# Patient Record
(demographics unavailable — no encounter records)

---

## 2025-01-21 NOTE — PHYSICAL EXAM

## 2025-01-21 NOTE — DISCUSSION/SUMMARY
[Normal Growth] : growth [Normal Development] : development  [No Elimination Concerns] : elimination [Continue Regimen] : feeding [No Skin Concerns] : skin [Normal Sleep Pattern] : sleep [None] : no medical problems [Anticipatory Guidance Given] : Anticipatory guidance addressed as per the history of present illness section [No Medications] : ~He/She~ is not on any medications [] : The components of the vaccine(s) to be administered today are listed in the plan of care. The disease(s) for which the vaccine(s) are intended to prevent and the risks have been discussed with the caretaker.  The risks are also included in the appropriate vaccination information statements which have been provided to the patient's caregiver.  The caregiver has given consent to vaccinate. [FreeTextEntry1] : Patient to return for a well  appointment in  1 year  throw away pacifier  to see ENT vaccines

## 2025-01-21 NOTE — BEGINNING OF VISIT
[Mother] : mother [Patient] : patient [FreeTextEntry1] : LEIDA SOCORRORAY is a 4 year old here for well

## 2025-01-21 NOTE — DEVELOPMENTAL MILESTONES
[Normal Development] : Normal Development [Goes to the bathroom and has] : goes to bathroom and has bowel movement by self [Dresses and undresses without] : dresses and undresses without much help [Plays make-believe] : plays make-believe [Uses 4-word sentences] : uses 4-word sentences [Uses words that are 100%] : uses words that are 100% intelligible to strangers [Tells a story from a book] : tells a story from a book [Climbs stairs, alternating feet] : climbs stairs, alternating feet without support [Skips on one foot] : skips on one foot [Draws a person with head and] : draws a person with head and 3 body part [Draws a simple cross] : draws a simple cross [Unbuttons medium-sized buttons] : unbuttons medium sized buttons [Grasps a pencil with thumb and] : grasps a pencil with thumb and fingers instead of fist [Draws recognizable pictures] : draws recognizable pictures [Yes] : Completed.

## 2025-01-21 NOTE — HISTORY OF PRESENT ILLNESS
[Mother] : mother [whole ___ oz/d] : consumes [unfilled] oz of whole cow's milk per day [Fruit] : fruit [Vegetables] : vegetables [Meat] : meat [Grains] : grains [Eggs] : eggs [Fish] : fish [Dairy] : dairy [Toilet Trained] : toilet trained [Normal] : Normal [In own bed] : In own bed [Pacifier use] : Pacifier use [Brushing teeth] : Brushing teeth [Yes] : Patient goes to dentist yearly [Vitamin] : Primary Fluoride Source: Vitamin [In Pre-K] : In Pre-K [No] : Not at  exposure [Carbon Monoxide Detectors] : Carbon monoxide detectors [Smoke Detectors] : Smoke detectors [Up to date] : Up to date [YES] : Yes [Are there any children in your household?] : There are children in the household. [de-identified] : 2 small cavities  [FreeTextEntry9] : [employer]  [Are there any unlocked firearms stored in your household?] : No unlocked firearms in the household. [Are there any firearms stored in your household that are loaded?] : No firearms are stored in the household loaded. [Has anyone in the household been feeling low/depressed/been struggling?] : No one in the household has been feeling low/depressed/been struggling. [Have you attended a firearm safety workshop or class?] : No firearm safety workshop or class has been attended. [FreeTextEntry1] :  mom says pt is saying what a lot  mom says pt complains after eating of stomachache

## 2025-02-12 NOTE — HISTORY OF PRESENT ILLNESS
[FreeTextEntry1] : Crystal is a 4 year old female seen for initial behavioral audiologic evaluation following failed screening and abnormal tympanogram at pediatrician's office. Bitely hearing screening passed at birth. Mother reports concern re: hearing over past year- pt requires consistent repetition of words, says "what" frequently. Speech developing appropriately as per mother.

## 2025-02-12 NOTE — ASSESSMENT
[FreeTextEntry1] : Results and recommendations discussed with mother- mother would like to proceed with ENT at this site. Tasked ENT team to schedule.

## 2025-02-12 NOTE — PROCEDURE
[226 Hz] : 226 Hz [Normal Eardrum Mobility] : consistent with restricted eardrum mobility [Type B Tympanogram] : Type B Flat [] : Audiogram: [Play Audiometry] : Play Audiometry [Good] : good [de-identified] : Moderate hearing loss 250-1kHz in at least one ear (soundfield) and mild hearing loss 2-4kHz bilaterally (headphones). Results of unmasked bone conduction WNL 2-4kHz in at least one ear. Pt fatigued for further testing under headphones/masked bone conduction.

## 2025-02-12 NOTE — PLAN
[FreeTextEntry2] : ENT evaluation re: middle ear status and conductive hearing loss (scheduled for 2/21/25)

## 2025-02-21 NOTE — CONSULT LETTER
[FreeTextEntry2] : Tawny Lange, DO [address] [place], [place] [id number] [phone] [FreeTextEntry3] : Aba Topete MD Pediatric Otolaryngology [address] [address] Tel [phone] Fax [phone]

## 2025-02-21 NOTE — HISTORY OF PRESENT ILLNESS
[No Personal or Family History of Easy Bruising, Bleeding, or Issues with General Anesthesia] : No Personal or Family History of easy bruising, bleeding, or issues with general anesthesia [de-identified] : 5 y/o F presents for evaluation for hearing loss Concerns for past year -- failed hearing test in PCP office.  Constantly saying "what" or listening to TV on loud volume No family history of hearing loss. No history of recurrent ear infections No speech concerns.  Constant nasal congestion, occasional anterior rhinorrhea No nasal spray usage.  Denies recent fevers or infections

## 2025-02-21 NOTE — DATA REVIEWED
[FreeTextEntry1] :  I ordered, reviewed and interpreted today's audiometric testing myself and discussed the results with the family.   Suspected Diagnosis: bilateral conductive hearing loss:   My interpretation is in keeping with:   Right ear: type B tympanogram (likely due to Eustachian tube dysfunction or effusion) Left ear:  type B tympanogram (likely due to Eustachian tube dysfunction or effusion)   Bilateral conductive hearing loss / hearing loss in the soundfield

## 2025-02-21 NOTE — PHYSICAL EXAM
[Effusion] : effusion [Exposed Vessel] : left anterior vessel not exposed [Clear to Auscultation] : lungs were clear to auscultation bilaterally [Wheezing] : no wheezing [Increased Work of Breathing] : no increased work of breathing with use of accessory muscles and retractions [Normal Gait and Station] : normal gait and station [Normal muscle strength, symmetry and tone of facial, head and neck musculature] : normal muscle strength, symmetry and tone of facial, head and neck musculature [Normal] : no cervical lymphadenopathy

## 2025-02-21 NOTE — REASON FOR VISIT
[Initial Evaluation] : an initial evaluation for [Parents] : parents [FreeTextEntry2] : hearing loss

## 2025-02-28 NOTE — HISTORY OF PRESENT ILLNESS
[EENT/Resp Symptoms] : EENT/RESPIRATORY SYMPTOMS [Fever] : fever [Eye redness] : eye redness [Runny nose] : runny nose [___ Day(s)] : [unfilled] day(s) [Clear rhinorrhea] : clear rhinorrhea [Sick Contacts: ___] : no sick contacts [Cough] : no cough [Vomiting] : no vomiting [Diarrhea] : no diarrhea

## 2025-02-28 NOTE — PHYSICAL EXAM
[Conjuctival Injection] : conjunctival injection [Discharge] : discharge [Right] : (right) [Clear] : right tympanic membrane clear [NL] : regular rate and rhythm, normal S1, S2 audible, no murmurs

## 2025-03-20 NOTE — HISTORY OF PRESENT ILLNESS
[Preoperative Visit] : for a medical evaluation prior to surgery [Good] : Good [Prior Anesthesia] : Prior anesthesia [Anesthesia Reaction] : no anesthesia reaction [FreeTextEntry1] : bmt and adenoids  [FreeTextEntry2] : 3/24/25

## 2025-03-20 NOTE — PHYSICAL EXAM
[General Appearance - Well Developed] : interactive [General Appearance - Well-Appearing] : well appearing [General Appearance - In No Acute Distress] : in no acute distress [Sclera] : the conjunctiva were normal [Outer Ear] : the ears and nose were normal in appearance [Examination Of The Oral Cavity] : mucous membranes were moist and pink [Neck Cervical Mass (___cm)] : no neck mass was observed [Respiration, Rhythm And Depth] : normal respiratory rhythm and effort [Auscultation Breath Sounds / Voice Sounds] : clear bilateral breath sounds [Heart Rate And Rhythm] : heart rate and rhythm were normal [Heart Sounds] : normal S1 and S2 [Murmurs] : no murmurs [Bowel Sounds] : normal bowel sounds [Abdomen Soft] : soft [Abdomen Tenderness] : non-tender [Abdominal Distention] : nondistended [Musculoskeletal Exam: Normal Movement Of All Extremities] : normal movements of all extremities [No Visual Abnormalities] : no visible abnormailities [Motor Tone] : normal muscle strength and tone [Generalized Lymph Node Enlargement] : no lymphadenopathy [Skin Color & Pigmentation] : normal skin color and pigmentation [] : no significant rash [Skin Lesions] : no skin lesions [Initial Inspection: Infant Active And Alert] : active and alert [FreeTextEntry1] : dull b/l